# Patient Record
(demographics unavailable — no encounter records)

---

## 2025-05-15 NOTE — HISTORY OF PRESENT ILLNESS
I have reviewed the notes, assessments, and/or procedures performed by the resident, I concur with her/his documentation of Akbar Roy.     Nina Vega MD   [de-identified] : Eloina Rcuker attended a follow-up appointment concerning her allergy injections for seasonal allergies. She reported that her symptoms have not been troubling her and that the injections have been effective in desensitizing her symptoms, which she has been receiving since October 24, 2019. Previously, she experienced symptoms such as itchy, watery eyes and a runny nose. She inquired about the possibility of discontinuing her allergy shots.

## 2025-05-15 NOTE — REASON FOR VISIT
[Routine Follow-Up] : a routine follow-up visit for [FreeTextEntry3] : For re-evaluating her immunotherapy treatment. [Mother] : mother

## 2025-05-15 NOTE — ASSESSMENT
[FreeTextEntry1] : She is able to stop her allergy Injection, She started it on 10/24/2019 She is physically fine, she has not symptoms.